# Patient Record
(demographics unavailable — no encounter records)

---

## 2024-10-09 NOTE — IMPRESSION
[_____] : grasses ([unfilled]) [Allergy Testing Cockroach] : cockroach [Allergy Testing Mixed Feathers] : feathers [FreeTextEntry2] : AST was positive to salmon 2+. Negative to cod, flounder, halibut, tuna, oyster, clam, lobster, scallop. Will do blood test for shrimp and crab due to the severity of her reaction

## 2024-10-09 NOTE — ASSESSMENT
[FreeTextEntry1] : Allergy to crab and shrimp Immunocap - may be able to reintroduce finned fish depending on the results. Epi-pen is up to date  Allergic rhinitis Antihistamines PRN

## 2024-10-09 NOTE — HISTORY OF PRESENT ILLNESS
[de-identified] : Mahogany reports that on 9/18 she ate crab legs. While eating she felt her throat get itchy and felt weird. She stopped eating them and her symptoms resolved. The next day she tried eating crab legs again. Had the same symptoms. Stopped eating them. The next day she was cleaning shrimp. Started sneezing. Ate one shrimp and immediately she started choking, coughing and vomiting. Her face became red, itchy and swollen. She took 2 Benadryl and went to sleep propped up. The next day she continued to vomit and felt it was hard to breath. Felt like someone was squeezing her throat. Went to [facility] and was treated with steroids, Benadryl and Pepcid. She was prescribed an Epi-pen and instructed to follow-up with allergist. She used to eat shellfish without any issues in the past. She has since stopped eating all fish.   Mahogany also reports that she gets a lot of sneezing, typically in the morning and year-round. Would like to be checked for environmental allergies.

## 2024-10-09 NOTE — HISTORY OF PRESENT ILLNESS
[de-identified] : Mahogany reports that on 9/18 she ate crab legs. While eating she felt her throat get itchy and felt weird. She stopped eating them and her symptoms resolved. The next day she tried eating crab legs again. Had the same symptoms. Stopped eating them. The next day she was cleaning shrimp. Started sneezing. Ate one shrimp and immediately she started choking, coughing and vomiting. Her face became red, itchy and swollen. She took 2 Benadryl and went to sleep propped up. The next day she continued to vomit and felt it was hard to breath. Felt like someone was squeezing her throat. Went to [facility] and was treated with steroids, Benadryl and Pepcid. She was prescribed an Epi-pen and instructed to follow-up with allergist. She used to eat shellfish without any issues in the past. She has since stopped eating all fish.   Mahogany also reports that she gets a lot of sneezing, typically in the morning and year-round. Would like to be checked for environmental allergies.

## 2024-10-09 NOTE — PHYSICAL EXAM
[Alert] : alert [Healthy Appearance] : healthy appearance [No Acute Distress] : no acute distress [Normal Pupil & Iris Size/Symmetry] : normal pupil and iris size and symmetry [No Discharge] : no discharge [Sclera Not Icteric] : sclera not icteric [Normal TMs] : both tympanic membranes were normal [Normal Nasal Mucosa] : the nasal mucosa was normal [Normal Lips/Tongue] : the lips and tongue were normal [Normal Outer Ear/Nose] : the ears and nose were normal in appearance [No Nasal Discharge] : no nasal discharge [No Thrush] : no thrush [No LAD] : no lymphadenopathy [Supple] : the neck was supple [Normal Rate and Effort] : normal respiratory rhythm and effort [No Crackles] : no crackles [No Retractions] : no retractions [Bilateral Audible Breath Sounds] : bilateral audible breath sounds [Normal Rate] : heart rate was normal  [Normal S1, S2] : normal S1 and S2 [No murmur] : no murmur [Regular Rhythm] : with a regular rhythm [Skin Intact] : skin intact  [No Rash] : no rash [No Skin Lesions] : no skin lesions [No Cyanosis] : no cyanosis [Normal Mood] : mood was normal [Normal Affect] : affect was normal [Judgment and Insight Age Appropriate] : judgement and insight is age appropriate

## 2024-10-09 NOTE — IMPRESSION
[_____] : grasses ([unfilled]) [Allergy Testing Mixed Feathers] : feathers [Allergy Testing Cockroach] : cockroach [FreeTextEntry2] : AST was positive to salmon 2+. Negative to cod, flounder, halibut, tuna, oyster, clam, lobster, scallop. Will do blood test for shrimp and crab due to the severity of her reaction

## 2024-10-09 NOTE — REVIEW OF SYSTEMS
[Nl] : Integumentary [Immunizations are up to date] : Immunizations are up to date [Received Influenza Vaccine this Past Year] : Patient has not received the Influenza vaccine this past year

## 2024-10-09 NOTE — SOCIAL HISTORY
[House] : [unfilled] lives in a house  [Radiator/Baseboard] : heating provided by radiator(s)/baseboard(s) [Window Units] : air conditioning provided by window units [Dehumidifier] : uses a dehumidifier [Dog] : dog [] :  [Bedroom] : not in the bedroom [Basement] : not in the basement [Living Area] : not in the living area [Smokers in Household] : there are no smokers in the home

## 2025-02-13 NOTE — HISTORY OF PRESENT ILLNESS
[de-identified] : Mahogany is a 34yo F pw bilateral knee pain. Right > Left. Ongoing for 2 months, denies any recent injury, hx of injury. Main c/o pain with prolonged walking, standing, or navigating stairs. At worse is 9 out of 10, has trialed ibuprofen and icy hot which does not help. Pain at worse is 9 out of 10, some relief with rest. Denies buckling, locking but reports clicking in right knee. Has not trialed physical therapy, no hx of injections.

## 2025-02-13 NOTE — PHYSICAL EXAM
[de-identified] : Gen: NAD Resp: Nonlabored respirations, no SOB Gait: Nl   R Knee: Skin intact No effusion 0-130 AROM Tender MJL 5/5 IP Q EHL TA GS SILT L3-S1 2+ dp pt wwp bcr   1A lachman and (-) pivot shift Grade 1 posterior drawer Stable to v/v at 0 and 30 degrees (-) Dial test at 30, 90 degrees   (+) Robert, Thessaly Medial joint pain with shallow 2 leg squat   1+ patellar translation (-) pain with patellar compression/grind (-) J sign (-) apprehension  [de-identified] : The following radiographs were ordered and read by me during this patient's visit. I reviewed each radiograph in detail with the patient and discussed the findings as highlighted below.   3 views of the L + R knees were obtained today that show no fracture, or dislocation. There are no degenerative changes seen. There is no malalignment. No obvious osseous abnormality. Otherwise unremarkable.

## 2025-02-13 NOTE — DISCUSSION/SUMMARY
[de-identified] : 33yF pw bl knee pain, possible small R mmt, no mechanical sx, atraumatic.  The patient was extensively counseled on treatment options including but not limited to observation, rest/activity modification, bracing, anti-inflammatory medications, physical therapy, injections, and surgery.  The natural history of the disease was thoroughly explained.  We discussed that the majority of the time, this condition can be initially treated conservatively. The patient will proceed with: -PT -diclofenac rx provided - pt instructed to take with meals and not with other nsaid's including advil, aleve, and toradol.  The pt understands to stop taking the medication if any stomach sx develop or they develop any type of bleeding or bruising, at which point they will present to their PMD -pt was instructed on the importance of resting, icing and elevating to minimize swelling -RTC 6w   I have personally obtained the history, reviewed the ROS as noted, and performed the physical examination today.  The patient and I discussed the assessment and options and developed the plan.  All questions were answered and the patient stated their understanding of the treatment plan and appreciation of the visit.   My cumulative time spent on this patient's visit included: Preparation for the visit, review of the medical records, review of pertinent diagnostic studies, examination and counseling of the patient on the above diagnosis, treatment plan and prognosis, orders of diagnostic tests, medications and/or appropriate procedures and documentation in the medical records of today's visit.   Antolin Monge MD

## 2025-05-05 NOTE — HISTORY OF PRESENT ILLNESS
[de-identified] : 34-year-old female presents with right shoulder pain since June 21, 2024. 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  The pain is constant and described as worse when lying down and with internal rotation.  She reports radiation of the pain down her right arm and associated weakness. The pain and weakness are impacting her ability to perform ADLs, requiring assistance. She is currently managing the pain with ibuprofen and Tylenol.  The patient's past medical history, past surgical history, medications and allergies were reviewed by me today with the patient and documented accordingly. In addition, the patient's family and social history, which were noncontributory to this visit were reviewed also.

## 2025-05-05 NOTE — PHYSICAL EXAM
[de-identified] : Right shoulder exam  Inspection: No malalignment, No defects, No atrophy Skin: No masses, No lesions Neck: Negative Spurling's, full ROM, no pain with ROM AROM: FF to 160, abduction to 80, ER to 70, IR to mid lumbar Painful arc ROM: with gross motion Tenderness: +bicipital groove tenderness, no tenderness to the greater tuberosity/RTC insertion, no anterior shoulder/lesser tuberosity tenderness Strength: 5/5 ER, 5/5 IR in adduction, 5/5 supraspinatus testing, negative Holt's test AC Joint: No ttp/pain with cross arm testing Biceps: Speed Negative, Yergusons Negative Impingement test: +Perez, +Neer  Stability: Stable Vasc: 2+ radial pulse Neuro: AIN, PIN, Ulnar nerve intact to motor Sensation: Intact to light touch throughout  [de-identified] : The following radiographs were ordered and read by me during this patients visit. I reviewed each radiograph in detail with the patient and discussed the findings as highlighted below.   3 views of the right shoulder were obtained, 05/05/2025, that show no acute fracture or dislocation. There is no glenohumeral and mild AC joint degenerative change seen. Type II acromion. There is no significant malalignment. Loose body in axillary region.

## 2025-05-05 NOTE — DISCUSSION/SUMMARY
[de-identified] : 33 y/o female with right shoulder pain.  Patient presents with right shoulder pain s/p injury at work in 2024. A discussion was had with the patient regarding potential sources of inflammatory shoulder discomfort, including rotator cuff tendon dysfunction (including tendonitis vs. internal structural damage), the glenoid labrum, subdeltoid/subacromial bursitis, or impingement of the rotator cuff at the acromion. Other contributing sources of pain may be the acromioclavicular joint or long head of the biceps tendon. Irritation is typically caused either by overhead repetitive activity or as a result of minor injury.  Radiographs suggest loose body at the inferior axillary pouch.   Discussed short-term and long-term outcomes as well as the goal of treatment to reduce pain and restore function. Nonsurgical treatment is typically first-line therapy that may take weeks to months to resolve symptoms; includes rest from overhead activities, NSAIDs, home exercise program versus physical therapy to restore normal strength/ROM/function of the shoulder, and possible corticosteroid injection. Also discussed the role of arthroscopic surgical intervention when nonsurgical treatment does not adequately relieve pain/inflammation.   Recommendations: Trial of conservative modalities as above (rest from overhead activity and activity avoidance, ice, NSAIDs if tolerated. MRI Rx given to patient to further delineate any internal structural derangement to the shoulder given chronicity of symptoms. This will allow for better understanding of the severity of disease and allow for better stratification of treatment strategies (surgical vs. non-surgical). Patient is agreeable to further imaging.   Follow-up: After MRI  Work: [employer] Disability status: Temporarily partially disabled Goal of RTW: TBD following MRI Date of RTW: N/A

## 2025-05-05 NOTE — ADDENDUM
[FreeTextEntry1] : This note was written by Janie Johnson on 05/05/2025, acting solely as a scribe for Dr. Diaz Barrow.   All medical record entries made by the Scribe were at my, Dr. Diaz Barrow, direction and personally dictated by me on 05/05/2025. I have personally reviewed the chart and agree that the record accurately reflects my personal performance of the history, physical exam, assessment and plan.

## 2025-05-27 NOTE — DISCUSSION/SUMMARY
[de-identified] : 33 y/o female with acute on chroinc right shoulder pain.  Patient presents with right shoulder pain s/p injury at work in 2024. MRI shows tiny interstitial injury to subscapularis with associated tendinopathy of the shoulder. Also chronic tear at the anterior/anteroinferior labrum. Evidence of stress at the Os acromiale with mild degenerative changes across the synchondrosis. I discussed sources of inflammatory shoulder discomfort and MRI findings in detail with the patient. We reviewed nonsurgical treatment includes rest from overhead activities, NSAIDs, home exercise program versus physical therapy to restore normal strength/ROM/function of the shoulder, and possible localized corticosteroid injection.    Recommendations: Conservative modalities as above (overhead activity rest/activity avoidance until less symptomatic, ice, Diclofenac Rx given, home exercise strengthening and stretching program). Physical therapy Rx given for shoulder/RTC strengthening and conditioning program   Follow-up 4-6 weeks as needed.  Work: [employer] Disability status: Temporarily partially disabled Goal of RTW: 4-6 weeks Date of RTW: N/A

## 2025-05-27 NOTE — ADDENDUM
[FreeTextEntry1] : This note was written by Janie Johnson on 05/22/2025 acting solely as a scribe for Dr. Diaz Barrow.   All medical record entries made by the Scribe were at my, Dr. Diaz Barrow, direction and personally dictated by me on 05/22/2025. I have personally reviewed the chart and agree that the record accurately reflects my personal performance of the history, physical exam, assessment and plan.

## 2025-05-27 NOTE — HISTORY OF PRESENT ILLNESS
[de-identified] : 34-year-old female presents with right shoulder pain since June 21, 2024. 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  She has obtained MRI after last visit. The pain is constant and described as worse when lying down and with internal rotation. She reports radiation of the pain down her right arm and associated weakness. The pain and weakness are impacting her ability to perform ADLs, requiring assistance. She is currently managing the pain with Naproxen and Tylenol.

## 2025-05-27 NOTE — PHYSICAL EXAM
[de-identified] : Right shoulder exam  Inspection: No malalignment, No defects, No atrophy Skin: No masses, No lesions Neck: Negative Spurling's, full ROM, no pain with ROM AROM: FF to 130, abduction to 80, ER to 70, IR to mid lumbar Painful arc ROM: with gross motion Tenderness: +bicipital groove tenderness, no tenderness to the greater tuberosity/RTC insertion, no anterior shoulder/lesser tuberosity tenderness Strength: 5/5 ER, 5/5 IR in adduction, 5/5 supraspinatus testing, negative Brown's test AC Joint: No ttp/pain with cross arm testing Biceps: Speed Negative, Yergusons Negative Impingement test: +Perez, +Neer  Stability: Stable Vasc: 2+ radial pulse Neuro: AIN, PIN, Ulnar nerve intact to motor Sensation: Intact to light touch throughout  [de-identified] : 3 views of the right shoulder were obtained, 05/05/2025, that show no acute fracture or dislocation. There is no glenohumeral and mild AC joint degenerative change seen. Type II acromion. There is no significant malalignment. Loose body in axillary region. Os acromiale.   MRI right shoulder dated 05/12/2025 shows low-grade intrasubstance partial-thickness, partial width tear of subscapularis tendon. Mild supraspinatus and infraspinatus tendinosis. Chronic tear at the anterior/anteroinferior labrum with associated 0.2 cm paralabral cyst. Tear at the posterosuperior labrum. Mild biceps tenosynovitis. Mild subacromial/subdeltoid bursitis. Mild acromioclavicular joint arthrosis. Os acromiale with mild degenerative changes across the synchondrosis.  We independently reviewed and discussed in detail the images and the radiologic reports with the patient.

## 2025-05-27 NOTE — HISTORY OF PRESENT ILLNESS
[de-identified] : 34-year-old female presents with right shoulder pain since June 21, 2024. 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  She has obtained MRI after last visit. The pain is constant and described as worse when lying down and with internal rotation. She reports radiation of the pain down her right arm and associated weakness. The pain and weakness are impacting her ability to perform ADLs, requiring assistance. She is currently managing the pain with Naproxen and Tylenol.

## 2025-05-27 NOTE — DISCUSSION/SUMMARY
[de-identified] : 33 y/o female with acute on chroinc right shoulder pain.  Patient presents with right shoulder pain s/p injury at work in 2024. MRI shows tiny interstitial injury to subscapularis with associated tendinopathy of the shoulder. Also chronic tear at the anterior/anteroinferior labrum. Evidence of stress at the Os acromiale with mild degenerative changes across the synchondrosis. I discussed sources of inflammatory shoulder discomfort and MRI findings in detail with the patient. We reviewed nonsurgical treatment includes rest from overhead activities, NSAIDs, home exercise program versus physical therapy to restore normal strength/ROM/function of the shoulder, and possible localized corticosteroid injection.    Recommendations: Conservative modalities as above (overhead activity rest/activity avoidance until less symptomatic, ice, Diclofenac Rx given, home exercise strengthening and stretching program). Physical therapy Rx given for shoulder/RTC strengthening and conditioning program   Follow-up 4-6 weeks as needed.  Work: [employer] Disability status: Temporarily partially disabled Goal of RTW: 4-6 weeks Date of RTW: N/A

## 2025-05-27 NOTE — PHYSICAL EXAM
[de-identified] : Right shoulder exam  Inspection: No malalignment, No defects, No atrophy Skin: No masses, No lesions Neck: Negative Spurling's, full ROM, no pain with ROM AROM: FF to 130, abduction to 80, ER to 70, IR to mid lumbar Painful arc ROM: with gross motion Tenderness: +bicipital groove tenderness, no tenderness to the greater tuberosity/RTC insertion, no anterior shoulder/lesser tuberosity tenderness Strength: 5/5 ER, 5/5 IR in adduction, 5/5 supraspinatus testing, negative Rankin's test AC Joint: No ttp/pain with cross arm testing Biceps: Speed Negative, Yergusons Negative Impingement test: +Perez, +Neer  Stability: Stable Vasc: 2+ radial pulse Neuro: AIN, PIN, Ulnar nerve intact to motor Sensation: Intact to light touch throughout  [de-identified] : 3 views of the right shoulder were obtained, 05/05/2025, that show no acute fracture or dislocation. There is no glenohumeral and mild AC joint degenerative change seen. Type II acromion. There is no significant malalignment. Loose body in axillary region. Os acromiale.   MRI right shoulder dated 05/12/2025 shows low-grade intrasubstance partial-thickness, partial width tear of subscapularis tendon. Mild supraspinatus and infraspinatus tendinosis. Chronic tear at the anterior/anteroinferior labrum with associated 0.2 cm paralabral cyst. Tear at the posterosuperior labrum. Mild biceps tenosynovitis. Mild subacromial/subdeltoid bursitis. Mild acromioclavicular joint arthrosis. Os acromiale with mild degenerative changes across the synchondrosis.  We independently reviewed and discussed in detail the images and the radiologic reports with the patient.

## 2025-06-23 NOTE — REASON FOR VISIT
Albendazole Pending    Insurance response  Prescription Drug Insurance: [employer]  Notes: Prior authorization submitted - will update provider when decision has been made by insurance.     [facility] [place]: [id number]     [Follow-Up Visit] : a follow-up visit for

## 2025-07-24 NOTE — ADDENDUM
[FreeTextEntry1] : This note was written by Janie Johnson on 07/24/2025 acting solely as a scribe for Dr. Diaz Barrow.   All medical record entries made by the Scribe were at my, Dr. Diaz Barrow, direction and personally dictated by me on 07/24/2025. I have personally reviewed the chart and agree that the record accurately reflects my personal performance of the history, physical exam, assessment and plan.

## 2025-07-24 NOTE — PHYSICAL EXAM
[de-identified] : Right shoulder exam  Inspection: No malalignment, No defects, No atrophy Skin: No masses, No lesions Neck: Negative Spurling's, full ROM, no pain with ROM AROM: FF to 180, abduction to 90, ER to 80, IR to mid lumbar Painful arc ROM: none Tenderness: no bicipital groove tenderness, no tenderness to the greater tuberosity/RTC insertion, no anterior shoulder/lesser tuberosity tenderness Strength: 5/5 ER, 5/5 IR in adduction, 5/5 supraspinatus testing, negative [place]'s test AC Joint: No ttp/pain with cross arm testing Biceps: Speed Negative, Yergusons Negative Impingement test: Negative Perez, Negative Neer  Stability: Stable Vasc: 2+ radial pulse Neuro: AIN, PIN, Ulnar nerve intact to motor Sensation: Intact to light touch throughout  [de-identified] : 3 views of the right shoulder were obtained, 05/05/2025, that show no acute fracture or dislocation. There is no glenohumeral and mild AC joint degenerative change seen. Type II acromion. There is no significant malalignment. Loose body in axillary region. Os acromiale.   MRI right shoulder dated 05/12/2025 shows low-grade intrasubstance partial-thickness, partial width tear of subscapularis tendon. Mild supraspinatus and infraspinatus tendinosis. Chronic tear at the anterior/anteroinferior labrum with associated 0.2 cm paralabral cyst. Tear at the posterosuperior labrum. Mild biceps tenosynovitis. Mild subacromial/subdeltoid bursitis. Mild acromioclavicular joint arthrosis. Os acromiale with mild degenerative changes across the synchondrosis.  We independently reviewed and discussed in detail the images and the radiologic reports with the patient.

## 2025-07-24 NOTE — DISCUSSION/SUMMARY
[de-identified] : 33 y/o female with acute on chronic right shoulder pain.  Patient presents for follow-up of right shoulder pain s/p injury at work in 2024. Patient has made significant improvement in ROM and pain since last visit with physical therapy. She also reports subjective improvement since last visit. Discussion was centered on continued PT to continue to improve her functionality and symptoms as well as RTW and ADLs at this time.   Recommendations: Continue PT Updated Rx given. Conservative care PRN.   Follow-up as needed  Work: [employer] Disability status: Not disabled Goal of RTW: May RTW 1 week Date of RTW: 08/05/2025

## 2025-07-24 NOTE — HISTORY OF PRESENT ILLNESS
[de-identified] : 34-year-old female presents for follow up of right shoulder pain since June 21, 2024. She has been participating in PT 2x per week for the past month. She feels significantly better and her ROM has improved significantly. Patient is also able to lift 25 lbs in PT. She wishes to continue with therapy and wishes to discuss returning to work.